# Patient Record
Sex: MALE | Race: WHITE | URBAN - NONMETROPOLITAN AREA
[De-identification: names, ages, dates, MRNs, and addresses within clinical notes are randomized per-mention and may not be internally consistent; named-entity substitution may affect disease eponyms.]

---

## 2021-01-21 ENCOUNTER — OFFICE VISIT (OUTPATIENT)
Dept: PRIMARY CARE CLINIC | Age: 21
End: 2021-01-21
Payer: COMMERCIAL

## 2021-01-21 VITALS
HEIGHT: 70 IN | WEIGHT: 166 LBS | OXYGEN SATURATION: 98 % | DIASTOLIC BLOOD PRESSURE: 60 MMHG | SYSTOLIC BLOOD PRESSURE: 115 MMHG | TEMPERATURE: 97.8 F | HEART RATE: 72 BPM | BODY MASS INDEX: 23.77 KG/M2

## 2021-01-21 DIAGNOSIS — M25.531 ACUTE WRIST PAIN, RIGHT: Primary | ICD-10-CM

## 2021-01-21 PROCEDURE — 99204 OFFICE O/P NEW MOD 45 MIN: CPT | Performed by: FAMILY MEDICINE

## 2021-01-21 RX ORDER — IBUPROFEN 600 MG/1
600 TABLET ORAL 3 TIMES DAILY PRN
Qty: 30 TABLET | Refills: 0 | Status: SHIPPED
Start: 2021-01-21 | End: 2021-10-28

## 2021-01-21 SDOH — ECONOMIC STABILITY: TRANSPORTATION INSECURITY
IN THE PAST 12 MONTHS, HAS THE LACK OF TRANSPORTATION KEPT YOU FROM MEDICAL APPOINTMENTS OR FROM GETTING MEDICATIONS?: NO

## 2021-01-21 SDOH — ECONOMIC STABILITY: FOOD INSECURITY: WITHIN THE PAST 12 MONTHS, THE FOOD YOU BOUGHT JUST DIDN'T LAST AND YOU DIDN'T HAVE MONEY TO GET MORE.: NEVER TRUE

## 2021-01-21 SDOH — ECONOMIC STABILITY: TRANSPORTATION INSECURITY
IN THE PAST 12 MONTHS, HAS LACK OF TRANSPORTATION KEPT YOU FROM MEETINGS, WORK, OR FROM GETTING THINGS NEEDED FOR DAILY LIVING?: NO

## 2021-01-21 SDOH — ECONOMIC STABILITY: FOOD INSECURITY: WITHIN THE PAST 12 MONTHS, YOU WORRIED THAT YOUR FOOD WOULD RUN OUT BEFORE YOU GOT MONEY TO BUY MORE.: NEVER TRUE

## 2021-01-21 ASSESSMENT — PATIENT HEALTH QUESTIONNAIRE - PHQ9
2. FEELING DOWN, DEPRESSED OR HOPELESS: 0
SUM OF ALL RESPONSES TO PHQ QUESTIONS 1-9: 0
1. LITTLE INTEREST OR PLEASURE IN DOING THINGS: 0
SUM OF ALL RESPONSES TO PHQ9 QUESTIONS 1 & 2: 0

## 2021-01-21 NOTE — LETTER
[facility] [address]  Phone: [phone]  Fax: [phone]    Mariella English MD        January 21, 2021     Patient: Theresa Irvin   YOB: 2000   Date of Visit: 1/21/2021       To Whom It May Concern: It is my medical opinion that Theresa Irvin may return to light duty immediately with the following restrictions: no use of right hand/wrist to lift or carry any loads. Further recommendations will be made pending further evaluation. If you have any questions or concerns, please don't hesitate to call.     Sincerely,        Mariella English MD

## 2021-01-21 NOTE — PROGRESS NOTES
21  Lennie Gonzalez : 2000 Sex: male  Age: 22 y.o. Assessment and Plan:  More Dai was seen today for establish care. Diagnoses and all orders for this visit:    Acute wrist pain, right  -     XR WRIST RIGHT (MIN 3 VIEWS); Future  -     Ashley Salcedo MD, Orthopaedics and Rehabilitation, [facility] 93    Other orders  -     ibuprofen (ADVIL;MOTRIN) 600 MG tablet; Take 1 tablet by mouth 3 times daily as needed for Pain      Unclear etiology of pt's pain - mechanism of injury favors possible strain but given loud cracking think xray is necessary. Further recommendations pending results. Will refer to ortho for opinion as well. Trial of ibuprofen for 3-4 days; can d/c if no improvement. Return if symptoms worsen or fail to improve. Chief Complaint   Patient presents with   Vlad Alexander Establish Care       [facility]  Pt here for acute concerns  He is a new patient to our practice   Pt is c/o pain right wrist   He's had some intermittent pain over the past 18-24 months, nothing too significant  Would last just a day or so at the time and go away on it's own   Never bothered him at work despite doing a lot of lifting     Just a few days ago, Tuesday, he was lifting a bar bell up and heard a very loud cracking noise in his right wrist  He also felt immediate sharp pain  There was no bruising or no swelling   Took an ibuprofen initially Tuesday night just to see if it would help   Since then, no improvement  If at rest, he's ok, but with any movement of the right wrist he feels 8/10 pain in his posterior medial wrist, around the ulnar styloid process   Still no appreciable swelling  He has to rotate at elbow to turn hand over  Also notes pain with any downward pressure in the hand     Problem list reviewed and updated in full with patient today as necessary. A comprehensive ROS was negative, except as documented above.    No other complaints  Patient is otherwise completely healthy per his report Current Outpatient Medications:     ibuprofen (ADVIL;MOTRIN) 600 MG tablet, Take 1 tablet by mouth 3 times daily as needed for Pain, Disp: 30 tablet, Rfl: 0  No Known Allergies    Pt's past medical and surgical history were updated as necessary today   Pt's family and social history were updated as necessary today          Vitals:    01/21/21 1258   BP: 115/60   Pulse: 72   Temp: 97.8 °F (36.6 °C)   TempSrc: Temporal   SpO2: 98%   Weight: 166 lb (75.3 kg)   Height: 5' 10\" (1.778 m)       Physical Exam  Constitutional:       Appearance: Normal appearance. HENT:      Head: Normocephalic and atraumatic. Eyes:      Conjunctiva/sclera: Conjunctivae normal.   Cardiovascular:      Rate and Rhythm: Normal rate and regular rhythm. Heart sounds: Normal heart sounds. Pulmonary:      Effort: Pulmonary effort is normal.      Breath sounds: Normal breath sounds. Abdominal:      Palpations: Abdomen is soft. Tenderness: There is no abdominal tenderness. Musculoskeletal:      Right wrist: He exhibits decreased range of motion and tenderness. He exhibits no swelling and no deformity. Arms:    Skin:     General: Skin is warm and dry. Neurological:      General: No focal deficit present. Mental Status: He is alert and oriented to person, place, and time.    Psychiatric:         Mood and Affect: Mood normal.         Behavior: Behavior normal.         Seen By:  Richardean Dancer, MD

## 2021-01-22 ENCOUNTER — TELEPHONE (OUTPATIENT)
Dept: ADMINISTRATIVE | Age: 21
End: 2021-01-22

## 2021-01-22 NOTE — TELEPHONE ENCOUNTER
Patient needs a letter to return to work as soon as possible. He was seen on 01/21/21 as a New patient. His employer needs this letter. Please call patient when it is ready as he will come to the office to pick it up.

## 2021-01-22 NOTE — TELEPHONE ENCOUNTER
Did patient get his xray done? He was given note yesterday at his request for light duty because of his symptoms. Further evaluation necessary before I can clear him to go back to work.

## 2021-01-26 ENCOUNTER — OFFICE VISIT (OUTPATIENT)
Dept: PRIMARY CARE CLINIC | Age: 21
End: 2021-01-26
Payer: COMMERCIAL

## 2021-01-26 VITALS
HEART RATE: 63 BPM | SYSTOLIC BLOOD PRESSURE: 118 MMHG | OXYGEN SATURATION: 98 % | TEMPERATURE: 97.6 F | WEIGHT: 166 LBS | BODY MASS INDEX: 23.82 KG/M2 | DIASTOLIC BLOOD PRESSURE: 78 MMHG

## 2021-01-26 DIAGNOSIS — M25.531 ACUTE WRIST PAIN, RIGHT: ICD-10-CM

## 2021-01-26 DIAGNOSIS — M25.531 ACUTE WRIST PAIN, RIGHT: Primary | ICD-10-CM

## 2021-01-26 PROCEDURE — 99212 OFFICE O/P EST SF 10 MIN: CPT | Performed by: FAMILY MEDICINE

## 2021-01-26 NOTE — LETTER
[address]  Phone: [phone]  Fax: [phone]    Fede Romo MD        January 26, 2021     Patient: Tyler Tolentino   YOB: 2000   Date of Visit: 1/26/2021       To Whom It May Concern: It is my medical opinion that Tyler Tolentino may return to full duty immediately with no restrictions. If you have any questions or concerns, please don't hesitate to call.     Sincerely,        Fede Romo MD

## 2021-01-26 NOTE — PROGRESS NOTES
21  Arias Prater : 2000 Sex: male  Age: 22 y.o. Assessment and Plan:  Mariposa Lopez was seen today for other. Diagnoses and all orders for this visit:    Acute wrist pain, right    Presumed tendonitis that has resolved. RTW note written since he states he is fine and would like to return to work. D/w pt precautions moving forward including possible use of brace. If sx return, would encourage f/u with ortho. Patient expressed good verbal understanding of, and was in agreement with, our plan today. Xray report from [facility] reviewed - no fx, otherwise WNL. Return if symptoms worsen or fail to improve. Chief Complaint   Patient presents with   [facility] Other     return to work note        HPI   Pt here for f/u right wrist pain  Pt had xrays done last week and reports they were negative for any fracture  He felt it must be a tendon issue then  Took the ibuprofen and rested his right wrist/hand completely over the weekend   Now states his pain is resolved, no limitation in terms of ROM and strenght is fine  He states he feels 100% and would like note to return to work     Problem list reviewed and updated in full with patient today as necessary. A comprehensive ROS was negative, except as documented above. Pt states he is otherwise fine and without complain, no changes from most recent visit. Current Outpatient Medications:     ibuprofen (ADVIL;MOTRIN) 600 MG tablet, Take 1 tablet by mouth 3 times daily as needed for Pain, Disp: 30 tablet, Rfl: 0  No Known Allergies    Pt's past medical and surgical history were updated as necessary today   Pt's family and social history were updated as necessary today          Vitals:    21 0738   BP: 118/78   Pulse: 63   Temp: 97.6 °F (36.4 °C)   TempSrc: Temporal   SpO2: 98%   Weight: 166 lb (75.3 kg)       Physical Exam  Constitutional:       Appearance: Normal appearance. HENT:      Head: Normocephalic and atraumatic.    Pulmonary: Effort: Pulmonary effort is normal.   Musculoskeletal:      Right wrist: He exhibits normal range of motion, no tenderness, no bony tenderness, no swelling and no deformity. Neurological:      Mental Status: He is alert.          Seen By:  Magnolia Robin MD

## 2021-10-28 ENCOUNTER — OFFICE VISIT (OUTPATIENT)
Dept: FAMILY MEDICINE CLINIC | Age: 21
End: 2021-10-28

## 2021-10-28 VITALS
WEIGHT: 180 LBS | RESPIRATION RATE: 16 BRPM | TEMPERATURE: 97.9 F | BODY MASS INDEX: 25.83 KG/M2 | DIASTOLIC BLOOD PRESSURE: 80 MMHG | SYSTOLIC BLOOD PRESSURE: 118 MMHG | OXYGEN SATURATION: 98 % | HEART RATE: 84 BPM

## 2021-10-28 DIAGNOSIS — Z02.1 PHYSICAL EXAM, PRE-EMPLOYMENT: Primary | ICD-10-CM

## 2021-10-28 PROCEDURE — BUTECHPX BUTECH CORPORATE ACCOUNT PHYSICAL: Performed by: NURSE PRACTITIONER

## 2021-10-28 NOTE — PROGRESS NOTES
10/28/21  Luz Petty : 2000 Sex: male  Age 22 y.o. Subjective:  Chief Complaint   Patient presents with    Employment Physical       HPI:   Luz Petty is a 22 y.o. old male presents to the clinic for a physical.  Pt reports to be feeling well without any complaints at this time. He denies any CP with exertion, STEPHENSON, dizziness with exertion, history of syncope without trauma, palpitations, weakness in extremities, recent illness, previous cardiac issues, seizure history, or asthma history. Denies any family history of sudden cardiac death. Pt denies any drug, ETOH, or tobacco use. Denies any homicidal or suicidal thoughts. ROS:   Unless otherwise stated in this report the patient's positive and negative responses for review of systems for constitutional, eyes, ENT, cardiovascular, respiratory, gastrointestinal, neurological, , musculoskeletal, and integument systems and related systems to the presenting problem are either stated in the history of present illness or were not pertinent or were negative for the symptoms and/or complaints related to the presenting medical problem. Positives and pertinent negatives as per HPI. All others reviewed and are negative. PMH:   History reviewed. No pertinent past medical history. History reviewed. No pertinent surgical history. History reviewed. No pertinent family history. Medications:   No current outpatient medications on file. Allergies:   No Known Allergies    Social History:     Social History     Tobacco Use    Smoking status: Never Smoker    Smokeless tobacco: Never Used   Substance Use Topics    Alcohol use: Not on file    Drug use: Not on file       Patient lives at home.     Physical Exam:     Vitals:    10/28/21 1229   BP: 118/80   Pulse: 84   Resp: 16   Temp: 97.9 °F (36.6 °C)   TempSrc: Temporal   SpO2: 98%   Weight: 180 lb (81.6 kg)       Physical Exam (PE)   Constitutional:  A&Ox3, NAD, development consistent with age.  Head:  NCAT  Eyes:  EOMI, PERRLA. No conjunctival injection noted. Ears:  External ears without lesions. Ear canals without swelling or exudate. TMs translucent bilaterally with normal light reflex. Throat:  Posterior pharynx without erythema or exudate. Airway patient. Neck:  Supple. Nontender without adenopathy or thyromegaly. Lungs: CTAB without wheezing, rales, or rhonchi. Heart:  RRR, normal heart sounds without pathological murmurs. Abdomen:  Soft, nontender, and nondistended. BS+x4. No guarding, rigidity, or rebound tenderness. No masses. Genitalia: Exam not performed per request. Patient reported: Negative external genitalia rashes or lesions. Positive testes descended bilaterally. Negative inguinal hernias. Back:  ROM physiologic. Normal posture and spinal alignment. No costovertebral, paravertebral, intervertebral, or vertebral tenderness or spasm. Extremities:  No tenderness or swelling. ROM physiologic. No neurovascular deficit. Strength and  5/5 bilaterally. Skin:  Warm and appropriately dry without rashes, abrasions, ecchymoses, or lesions. Neuro:  Orientation age-appropriate. Motor functions intact. DTRs 2+ and equal bilaterally. Psych: Pleasant and appropriate. Normal mood and affect. Testing:   (All laboratory and radiology results have been personally reviewed by myself)  Labs:  No results found for this visit on 10/28/21. Imaging: All Radiology results interpreted by Radiologist unless otherwise noted. No orders to display       Assessment / Plan:   The patient's vitals, allergies, medications, and past medical history have been reviewed. Dominick Anand was seen today for employment physical.    Diagnoses and all orders for this visit:    Physical exam, pre-employment      - [facility]     Vision: Corrected? No  Hearing: No hearing impairment Restrictions?  No  Lift Test 80 pounds: Yes  Any restrictions on the ability to stand between 8-10 hours per day? No  Able to walk without issues at least 25% of workday? Yes   Any restrictions on the ablility to bend or twist? No  Able to lift up to 50 pounds? Yes  Any restrictions with the ability to use their hands which could inhibit the manipulation of hand  tools? (For example, but not limited to screw drivers-rotation type motion)  No  Any restrictions or difficulties with heights? No  (Ladders/platforms are used on some jobs up to 48 foot above ground)    Disposition: Home    - Pt appears to be in good health overall. He is cleared to work without restrictions. Advised to return immediately with any changes in physical or mental health. All questions answered. SIGNATURE: NICHELLE Capps    *NOTE: This report was transcribed using voice recognition software. Every effort was made to ensure accuracy; however, inadvertent computerized transcription errors may be present.